# Patient Record
Sex: MALE | Race: WHITE | NOT HISPANIC OR LATINO | Employment: FULL TIME | URBAN - NONMETROPOLITAN AREA
[De-identification: names, ages, dates, MRNs, and addresses within clinical notes are randomized per-mention and may not be internally consistent; named-entity substitution may affect disease eponyms.]

---

## 2022-12-20 ENCOUNTER — OFFICE VISIT (OUTPATIENT)
Dept: BEHAVIORAL HEALTH | Facility: CLINIC | Age: 32
End: 2022-12-20

## 2022-12-20 DIAGNOSIS — F33.1 MAJOR DEPRESSIVE DISORDER, RECURRENT EPISODE, MODERATE: Primary | ICD-10-CM

## 2022-12-20 PROCEDURE — 90791 PSYCH DIAGNOSTIC EVALUATION: CPT | Performed by: PSYCHOLOGIST

## 2022-12-20 NOTE — PROGRESS NOTES
"12/20/2022    This provider is located at the [facility] (through [facility]), [address], [place] [address] using a secure [employer] Video Visit through [employer]. Patient is being seen remotely via telehealth at their home address in [place], and stated they are in a secure environment for this session. The patient's condition being diagnosed/treated is appropriate for telemedicine. The provider identified herself as well as her credentials.   The patient, and/or patients guardian, consent to be seen remotely, and when consent is given they understand that the consent allows for patient identifiable information to be sent to a third party as needed.   They may refuse to be seen remotely at any time. The electronic data is encrypted and password protected, and the patient and/or guardian has been advised of the potential risks to privacy not withstanding such measures.    You have chosen to receive care through a telehealth visit.  Do you consent to use a video/audio connection for your medical care today? Yes    Constantine Richard, a 32 y.o. male, was seen today for initial appointment lasting 45 minutes. 2-2:45pm CST  Patient is referred by Kinjal Blackwood LCSW ([facility]- [place], [place]) for an assessment related to ADHD.   He is 5'6\" tall and weighs 230 pounds.     SUBJECTIVE:  He is experiencing: inattention, racing thoughts, mood swings, easily distracted, poor organizational skills, poor coping skills, interrupts others, quick temper, irritability, forgetfulness, low tolerance to stress, sadness, amotivation, anhedonia, social isolation, fatigue, grief, worry, and passive personality traits.      The symptoms adversely affect his employment.     He was diagnosed ADHD (by [facility]) when he was about 6 years old. He was prescribed Ritalin and Adderall.     He is prescribed Abilify 5 mg, and Effexor 75 mg by KELLI Fry ([facility]).     He denied a history of emotional trauma. " "    FAMILY HISTORY:  ADHD- paternal cousin x 2  MDD- mother, father, paternal uncle, maternal aunt  Anxiety- mother, brother   Alcohol- paternal uncle   Drug- paternal uncle     The patient met all developmental milestones on time.  He described his health as \"pretty good\".     His parents  in 1996. The relationship produced 2 sons ('90 and '94).  His father works in the [employer] business. His mother works at [email or web address].     He  in 2016.  The relationship did not produce any children. They  in 2019.     He did not remarry. He lives alone.     He graduated from [employer] in 2009. He attended [employer] (2009-11).  He completed 4 semester.     He is employed [employer] in [place], [place] (2007 to present).   He has 5-6 friends.     MENTAL STATUS:  The patient was appropriately dressed and groomed.  The patient’s speech was WNL (rate, volume, articulation, coherence, etc.).  The patient was oriented to time, place, and person.  The patient’s memory (remote and recent) was intact.  The patient’s attention and concentration were WNL.  The patient’s mood and affect were congruent.    The patient’s thought content did not appear to possess delusions or hallucinations. These results do not appear to be significantly influenced by the effects of visual, auditory, or motor deficits, environmental/economic or cultural differences.  The patient denied SI/HI.        strengths: the patient is polite and courteous  weakness: the patient is unable to manage symptoms   short term goal: the patient will reduce symptoms from 8 x day to 1 x week  long term goal: the patient will eliminate the above-mentioned symptoms      DIAGNOSIS:    ICD-10-CM ICD-9-CM   1. Major depressive disorder, recurrent episode, moderate ([employer])  F33.1 296.32       ASSESSMENT PLAN:  He will complete the psychological assessment.  Copied text within this note has been reviewed and is accurate as of " 12/20/22          This document has been electronically signed by Jovan Aleman, PhD on December 20, 2022 14:26 CST

## 2023-01-16 ENCOUNTER — OFFICE VISIT (OUTPATIENT)
Dept: BEHAVIORAL HEALTH | Facility: CLINIC | Age: 33
End: 2023-01-16
Payer: MEDICAID

## 2023-01-16 DIAGNOSIS — F33.1 MODERATE EPISODE OF RECURRENT MAJOR DEPRESSIVE DISORDER: ICD-10-CM

## 2023-01-16 DIAGNOSIS — F90.2 ATTENTION DEFICIT HYPERACTIVITY DISORDER, COMBINED TYPE: ICD-10-CM

## 2023-01-16 PROCEDURE — 96130 PSYCL TST EVAL PHYS/QHP 1ST: CPT | Performed by: PSYCHOLOGIST

## 2023-02-14 NOTE — PROGRESS NOTES
[facility]  [address]   [place] [address]  PHONE : [phone]  FAX: [phone]      DATE:  01/16/2023    PATIENT:   Constantine Richard 1990                                 MEDICAL RECORD #:  [id number]  Chronological age: 32 y.o.   Date of Psychological Assessment:   Examiner: Jovan Aleman, PhD   Licensed Psychologist    Tests Administered:  Childers Brief Intelligence Test (KBIT-2)  Wide Range Achievement Test- Fifth Edition (WRAT-5)  Brown ADD Scales (Brown ADD)  Giang Depression Inventory (BDI-2)  Giang Anxiety Inventory (STEVEN)  Essie’s Incomplete Sentence Blank- Adult (RISB-A)  Clinical Interview and Review of Records    Identification and Referral Information:   Constantine Richard was referred by Kinjal Blackwood LCSW ([facility]- [place], [place]) for an assessment related to ADHD.    Presenting Problem and Background Information:   Constantine is presenting with the following symptoms: inattention, racing thoughts, mood swings, easily distracted, poor organizational skills, poor coping skills, interrupts others, quick temper, irritability, forgetfulness, low tolerance to stress, sadness, amotivation, anhedonia, social isolation, fatigue, grief, worry, and passive personality traits.       The symptoms adversely affect his employment.      He was diagnosed ADHD (by [facility]) when he was about 6 years old. He was prescribed Ritalin and Adderall.      He is prescribed Abilify 5 mg, and Effexor 75 mg by KELLI Fry ([facility]).      He denied a history of emotional trauma.        Behavioral Observations:  Constantine was alert and oriented to time, place, and person. His thought content did not appear to possess delusions or hallucinations. These results do not appear to be significantly influenced by the effects of visual, auditory, or motor deficits, environmental/economic or cultural differences. The following results are thought to be valid.      Test Results:  The  interpretive information in this report should be viewed as only one source of hypotheses and no decision should be based solely on this information. This data should be integrated with all other sources of information in reaching professional decisions about the individual. This report is confidential and intended for use by qualified professionals only.    KBIT-2  The KBIT-2 is a brief, individually administered measure of verbal and nonverbal intelligence for children, adolescents, and adults, spanning the ages from 4 to 90 years.    Constantine obtained an IQ Composite Standard Score of 96 which yielded a Percentile of 39 and places him in the Average range of intellectual functioning. A Percentile of 39 means that he scored as well as or better than 39 out of 100 peers in the sample population. At a 90% Confidence Interval his true IQ Score falls between 89 and 103.  Individuals with similar scores learn material at a similar rate compared to same age peers and require a similar degree of examples, repetition and guided practice as same-aged peers.    The 0 point difference between the Verbal Standard Score (96, Average, 39%ile, 18.6 years) and the Nonverbal Standard Score (96, Average, 39%ile, 14.8 years) was not significant and suggests that his verbal reasoning abilities are equally developed compared to his verbal reasoning abilities.     WRAT-5   The WRAT-5 is a screening measure of academic achievement. Mr. Richard graduated from [employer] in 2009. He attended [employer] (2009-11).  He completed 4 semester. He is employed [employer] in [place], [place] (2007 to present).     The results of the WRAT-5 indicate he is performing at a Grade Score of 8.2 in Word Reading, with a Word Reading Subtest Standard Score of 86 and a Percentile Rank of 18.  On the Spelling Subtest, the examinee obtained a Standard Score of 82 (12%ile) and a Grade Score of 7.0.  On the Math Computation Subtest he obtained a score of 86  (18%ile) and a Grade Score of 5.7.  On the Sentence Comprehension, the examinee obtained a Standard Score of 98 (45%ile) and a Grade Score of >12.9. On the Reading Composite the examinee obtained a Standard Score of 91 (27%ile).        The scores are commensurate with his cognitive ability.         Brown ADD Scales  The Brown ADD Scales help to assess a wide range of symptoms of executive function impairments associated with ADHD/ADD.  The Brown ADD Scales include 40 items that assess five clusters of ADHD-related executive function impairments.      Mr. Richard, his father (- Talib Richard), and his mother (Sandra Richard) completed the rating scales.  T-Scores 60 and above are considered clinically significant.  He obtained the following T-scores:      Activation Attention Effort Affect Memory Total Score   Self 75 84 81 73 72 82   supervisor 75 80 81 80 72 83   Mother  68 82 78 80 65 80     The results of the Brown ADD Scales indicate ADHD at home.  However, no data exists to establish the onset of symptoms.  A provisional diagnosis is warranted.     BDI-2  The BDI-2 is a 21 item, self-report instrument for measuring the severity of depression in adults and adolescents aged 13 years or older. The BDI-2 was developed for the assessment of symptoms corresponding to criteria for diagnosing depressive disorders listed in the American Psychiatric Association's Diagnostic and Statistical Manual of Mental Disorders (DSM-IV-TR). Scores above 28 are considered “severe”, 20-28 are “moderate”, 14-19 are “mild”, and 0-13 are “minimal”.        Mr. Richard obtained a score of 23 on the BDI-2. This score falls in the “moderate” range of depressive symptoms.  He is endorsing clinically significant symptoms of depression.    STEVEN  The Giang Anxiety Inventory (STEVEN) is a widely used 21-item self-report inventory used to assess anxiety levels in adults and adolescents. It has been used in multiple studies,  including in treatment-outcome studies for individuals who have experienced traumas. The age range for the measure is from 17 to 80 years.      The STEVEN discriminates between anxious and non-anxious groups.  Scores of 26 and above are “severe, 16-25 are “moderate”, 8-15 are “mild”, and 0-7 are “minimal”.    Mr. Richard obtained a score of 19 on the STEVEN.  This score falls in the “moderate” level of anxiety symptoms.  He is endorsing clinically significant symptoms of anxiety.     RISB-A  Essie’s Incomplete Sentence Blank- Adult is a 40 item fill-in-the blank project test designed to gather psychological data in the assessment process.  The results of the RISB-A indicate insomnia, nervousness, and inattention.     Diagnosis  Problems Addressed this Visit    None  Visit Diagnoses     Attention deficit hyperactivity disorder, combined type        Moderate episode of recurrent major depressive disorder (HCC)          Diagnoses       Codes Comments    Attention deficit hyperactivity disorder, combined type     ICD-10-CM: F90.2  ICD-9-CM: 314.01 Provisional     Moderate episode of recurrent major depressive disorder (HCC)     ICD-10-CM: F33.1  ICD-9-CM: 296.32         Summary:   Constantine Richard was referred by Kinjal Blackwood LCSW ([place], [place]) for an assessment related to ADHD.      The results of the K-BIT-2 indicate that he is performing in the Average range (96 IQ Composite).  The results of the WRAT-5 indicate he is performing with Grade Scores of 8.2 in Word Reading, 7.0 in Spelling, 5.7 in Math Computation, and >12.9 in Sentence Comprehension. The results of the Brown ADD indicate ADHD. The results of the BDI-2 indicate depression. The results of the STEVEN indicate anxiety. The results of the RISB-A indicate insomnia, nervousness, and inattention.    Recommendations:  It is the recommendation of the undersigned that Constantine Richard receive:   1. Counseling as needed to address ADHD (provisional), anxiety, and MDD  symptoms  2. Psychiatric services as needed   3. Vocational and educational assistance as available     I spent 60 minutes in direct face to face contact with patient.  Greater than 50% of this time was spent counseling patient and discussing plan of care.  Copied text within this note has been reviewed and is accurate as of 02/14/23            This document has been electronically signed by Jovan Aleman, PhD on February 14, 2023 16:51 CST        Jovan Aleman, PhD   Licensed Psychologist